# Patient Record
Sex: MALE | URBAN - METROPOLITAN AREA
[De-identification: names, ages, dates, MRNs, and addresses within clinical notes are randomized per-mention and may not be internally consistent; named-entity substitution may affect disease eponyms.]

---

## 2019-01-05 ENCOUNTER — APPOINTMENT (RX ONLY)
Dept: URBAN - METROPOLITAN AREA CLINIC 151 | Facility: CLINIC | Age: 49
Setting detail: DERMATOLOGY
End: 2019-01-05

## 2019-01-05 DIAGNOSIS — L01.01 NON-BULLOUS IMPETIGO: ICD-10-CM

## 2019-01-05 PROBLEM — L29.8 OTHER PRURITUS: Status: ACTIVE | Noted: 2019-01-05

## 2019-01-05 PROBLEM — E78.5 HYPERLIPIDEMIA, UNSPECIFIED: Status: ACTIVE | Noted: 2019-01-05

## 2019-01-05 PROBLEM — K21.9 GASTRO-ESOPHAGEAL REFLUX DISEASE WITHOUT ESOPHAGITIS: Status: ACTIVE | Noted: 2019-01-05

## 2019-01-05 PROBLEM — F32.9 MAJOR DEPRESSIVE DISORDER, SINGLE EPISODE, UNSPECIFIED: Status: ACTIVE | Noted: 2019-01-05

## 2019-01-05 PROCEDURE — ? COUNSELING

## 2019-01-05 PROCEDURE — ? DIAGNOSIS COMMENT

## 2019-01-05 PROCEDURE — ? RECOMMENDATIONS

## 2019-01-05 PROCEDURE — 99213 OFFICE O/P EST LOW 20 MIN: CPT

## 2019-01-05 PROCEDURE — ? PRESCRIPTION

## 2019-01-05 PROCEDURE — ? ORDER TESTS

## 2019-01-05 PROCEDURE — ? PRESCRIPTION MEDICATION MANAGEMENT

## 2019-01-05 RX ORDER — MUPIROCIN 20 MG/G
OINTMENT TOPICAL
Qty: 1 | Refills: 2 | Status: ERX | COMMUNITY
Start: 2019-01-05

## 2019-01-05 RX ORDER — CLINDAMYCIN HYDROCHLORIDE 300 MG/1
CAPSULE ORAL
Qty: 30 | Refills: 0 | Status: ERX | COMMUNITY
Start: 2019-01-05

## 2019-01-05 RX ADMIN — MUPIROCIN: 20 OINTMENT TOPICAL at 00:00

## 2019-01-05 RX ADMIN — CLINDAMYCIN HYDROCHLORIDE: 300 CAPSULE ORAL at 00:00

## 2019-01-05 ASSESSMENT — LOCATION SIMPLE DESCRIPTION DERM: LOCATION SIMPLE: CHIN

## 2019-01-05 ASSESSMENT — LOCATION DETAILED DESCRIPTION DERM: LOCATION DETAILED: RIGHT CHIN

## 2019-01-05 ASSESSMENT — LOCATION ZONE DERM: LOCATION ZONE: FACE

## 2019-01-05 NOTE — PROCEDURE: RECOMMENDATIONS
Recommendation Preamble: The following recommendations were made during the visit:
Recommendations (Free Text): Discuss with ENT\\nThorough anti-microbial cleaning of bike
Detail Level: Zone

## 2019-01-05 NOTE — PROCEDURE: PRESCRIPTION MEDICATION MANAGEMENT
Detail Level: Zone
Plan: Will start treatment with mupirocin 2% ointment on the chin, nose 2-3x/week as maintenance \\nWill start treatment with Clindamycin 300mg PO TID x10 days.
Render In Strict Bullet Format?: No

## 2019-01-05 NOTE — HPI: INFECTION (IMPETIGO)
How Severe Is It?: moderate
Is This A New Presentation, Or A Follow-Up?: Follow Up Impetigo
Additional History: He has previously been treated with doxycycline (may be resistant now), econazole 1% foam, bactroban, clindamycin 300mg TID [id number], verdeso, clindamycin Soln (most recent), and bactroban (most recent).

## 2019-01-05 NOTE — PROCEDURE: ORDER TESTS
Bill For Surgical Tray: no
Billing Type: Third-Party Bill
Expected Date Of Service: 01/05/2019
Performing Laboratory: [id number]